# Patient Record
Sex: FEMALE
[De-identification: names, ages, dates, MRNs, and addresses within clinical notes are randomized per-mention and may not be internally consistent; named-entity substitution may affect disease eponyms.]

---
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
## 2018-01-26 ENCOUNTER — HOSPITAL ENCOUNTER (EMERGENCY)
Dept: HOSPITAL 25 - UCEAST | Age: 18
Discharge: HOME | End: 2018-01-26
Payer: COMMERCIAL

## 2018-01-26 VITALS — DIASTOLIC BLOOD PRESSURE: 63 MMHG | SYSTOLIC BLOOD PRESSURE: 109 MMHG

## 2018-01-26 DIAGNOSIS — H10.30: Primary | ICD-10-CM

## 2018-01-26 DIAGNOSIS — J20.9: ICD-10-CM

## 2018-01-26 PROCEDURE — G0463 HOSPITAL OUTPT CLINIC VISIT: HCPCS

## 2018-01-26 PROCEDURE — 99212 OFFICE O/P EST SF 10 MIN: CPT

## 2018-01-26 NOTE — UC
Respiratory Complaint HPI





- HPI Summary


HPI Summary: 





16 yo female with cough/nasal congestion/post nasal drip and sore throat x 1 1/

2 weeks





no f/c


no cp or sob





cough productive of green sputum





now eyes red and goopy











- History of Current Complaint


Chief Complaint: UCRespiratory


Stated Complaint: EYE ISSUE SORE THROAT CONGESTION


Time Seen by Provider: 01/26/18 07:52


Hx Obtained From: Patient


Hx Last Menstrual Period: 1/7/2018


Onset/Duration: Gradual Onset, Lasting Weeks


Timing: Constant


Severity Initially: Mild


Severity Currently: Moderate


Pain Intensity: 5


Pain Scale Used: 0-10 Numeric


Character: Cough: Productive - green


Aggravating Factors: Nothing


Alleviating Factors: Nothing


Associated Signs And Symptoms: Positive: Nasal Congestion, Hoarseness, Sinus 

Discomfort





- Allergies/Home Medications


Allergies/Adverse Reactions: 


 Allergies











Allergy/AdvReac Type Severity Reaction Status Date / Time


 


No Known Allergies Allergy   Verified 01/26/18 07:36














PMH/Surg Hx/FS Hx/Imm Hx


Previously Healthy: Yes





- Surgical History


Surgical History: Yes


Surgery Procedure, Year, and Place: ACL and meniscus repair





- Family History


Known Family History: Positive: Unknown - pt adopted





- Social History


Alcohol Use: Occasionally


Substance Use Type: None


Smoking Status (MU): Never Smoked Tobacco





Review of Systems


Constitutional: Negative


Skin: Negative


Eyes: Drainage, Eye Redness


ENT: Sore Throat, Nasal Discharge, Sinus Congestion, Sinus Pain/Tenderness


Respiratory: Cough


Cardiovascular: Negative


Gastrointestinal: Negative


Genitourinary: Negative


Motor: Negative


Neurovascular: Negative


Musculoskeletal: Negative


Neurological: Negative


Psychological: Negative


Is Patient Immunocompromised?: No


All Other Systems Reviewed And Are Negative: Yes





Physical Exam


Triage Information Reviewed: Yes


Appearance: Well-Appearing, No Pain Distress, Well-Nourished


Vital Signs: 


 Initial Vital Signs











Temp  97.7 F   01/26/18 07:38


 


Pulse  70   01/26/18 07:38


 


Resp  18   01/26/18 07:38


 


BP  109/63   01/26/18 07:38


 


Pulse Ox  100   01/26/18 07:38











Vital Signs Reviewed: Yes


Eyes: Positive: Conjunctiva Inflamed, Discharge


ENT: Positive: Hearing grossly normal, Nasal congestion, TMs normal, Hoarse 

voice, Sinus tenderness - slight max.  Negative: Trismus, Muffled voice


Neck: Positive: Supple, Nontender, No Lymphadenopathy


Respiratory: Positive: Lungs clear, Normal breath sounds, No respiratory 

distress, No accessory muscle use


Cardiovascular: Positive: RRR, No Murmur


Musculoskeletal: Positive: ROM Intact, No Edema


Neurological: Positive: Alert


Psychological Exam: Normal


Skin Exam: Normal





UC Diagnostic Evaluation





- Laboratory


O2 Sat by Pulse Oximetry: 100 - normal/not hypoxic





Respiratory Course/Dx





- Differential Dx/Diagnosis


Provider Diagnoses: acute conjunctivitis.  braonchitis (acute)





Discharge





- Discharge Plan


Condition: Stable


Disposition: HOME


Prescriptions: 


Amoxicillin PO (*) [Amoxicillin 875 MG (*)] 875 mg PO BID #14 tab


Polymyx/Trimethoprim OPTH* [Polytrim OPHTH*] 1 - 2 drop BOTH EYES QID #1 btl


Patient Education Materials:  Acute Bronchitis (ED), Conjunctivitis (ED)


Referrals: 


No Primary Care Phys,NOPCP [Primary Care Provider] - 


Additional Instructions: 


recheck in 4-5 days if not better

## 2018-02-20 ENCOUNTER — APPOINTMENT (OUTPATIENT)
Dept: OBGYN | Facility: CLINIC | Age: 18
End: 2018-02-20

## 2018-05-22 ENCOUNTER — APPOINTMENT (OUTPATIENT)
Dept: OBGYN | Facility: CLINIC | Age: 18
End: 2018-05-22
Payer: COMMERCIAL

## 2018-05-22 PROCEDURE — 99395 PREV VISIT EST AGE 18-39: CPT

## 2018-06-19 ENCOUNTER — APPOINTMENT (OUTPATIENT)
Dept: OBGYN | Facility: CLINIC | Age: 18
End: 2018-06-19

## 2018-07-18 ENCOUNTER — APPOINTMENT (OUTPATIENT)
Dept: OBGYN | Facility: CLINIC | Age: 18
End: 2018-07-18
Payer: COMMERCIAL

## 2018-07-18 PROCEDURE — 76998 US GUIDE INTRAOP: CPT

## 2018-07-18 PROCEDURE — 81025 URINE PREGNANCY TEST: CPT

## 2018-07-18 PROCEDURE — 58300 INSERT INTRAUTERINE DEVICE: CPT

## 2018-08-07 ENCOUNTER — APPOINTMENT (OUTPATIENT)
Dept: FAMILY MEDICINE | Facility: CLINIC | Age: 18
End: 2018-08-07
Payer: COMMERCIAL

## 2018-08-07 VITALS
WEIGHT: 132 LBS | OXYGEN SATURATION: 99 % | BODY MASS INDEX: 22.53 KG/M2 | SYSTOLIC BLOOD PRESSURE: 108 MMHG | RESPIRATION RATE: 14 BRPM | DIASTOLIC BLOOD PRESSURE: 62 MMHG | HEIGHT: 64 IN | HEART RATE: 70 BPM | TEMPERATURE: 98.3 F

## 2018-08-07 DIAGNOSIS — Z87.09 PERSONAL HISTORY OF OTHER DISEASES OF THE RESPIRATORY SYSTEM: ICD-10-CM

## 2018-08-07 DIAGNOSIS — R53.81 OTHER MALAISE: ICD-10-CM

## 2018-08-07 DIAGNOSIS — Z87.19 PERSONAL HISTORY OF OTHER DISEASES OF THE DIGESTIVE SYSTEM: ICD-10-CM

## 2018-08-07 DIAGNOSIS — Z86.69 PERSONAL HISTORY OF OTHER DISEASES OF THE NERVOUS SYSTEM AND SENSE ORGANS: ICD-10-CM

## 2018-08-07 DIAGNOSIS — R22.2 LOCALIZED SWELLING, MASS AND LUMP, TRUNK: ICD-10-CM

## 2018-08-07 DIAGNOSIS — Z87.898 PERSONAL HISTORY OF OTHER SPECIFIED CONDITIONS: ICD-10-CM

## 2018-08-07 DIAGNOSIS — R53.83 OTHER MALAISE: ICD-10-CM

## 2018-08-07 DIAGNOSIS — Z87.440 PERSONAL HISTORY OF URINARY (TRACT) INFECTIONS: ICD-10-CM

## 2018-08-07 PROCEDURE — 90471 IMMUNIZATION ADMIN: CPT

## 2018-08-07 PROCEDURE — 90620 MENB-4C VACCINE IM: CPT

## 2018-08-07 PROCEDURE — 99395 PREV VISIT EST AGE 18-39: CPT | Mod: 25

## 2018-08-07 RX ORDER — GUAIFENESIN 600 MG/1
600 TABLET, EXTENDED RELEASE ORAL TWICE DAILY
Qty: 28 | Refills: 0 | Status: DISCONTINUED | COMMUNITY
Start: 2017-01-03 | End: 2018-08-07

## 2018-08-07 RX ORDER — PSEUDOEPHEDRINE HCL 30 MG/1
30 TABLET, FILM COATED ORAL
Qty: 1 | Refills: 0 | Status: DISCONTINUED | COMMUNITY
Start: 2017-01-03 | End: 2018-08-07

## 2018-08-09 ENCOUNTER — APPOINTMENT (OUTPATIENT)
Dept: FAMILY MEDICINE | Facility: CLINIC | Age: 18
End: 2018-08-09
Payer: COMMERCIAL

## 2018-08-09 VITALS — TEMPERATURE: 98.8 F | DIASTOLIC BLOOD PRESSURE: 58 MMHG | SYSTOLIC BLOOD PRESSURE: 98 MMHG

## 2018-08-09 DIAGNOSIS — Z23 ENCOUNTER FOR IMMUNIZATION: ICD-10-CM

## 2018-08-09 DIAGNOSIS — T78.40XA ALLERGY, UNSPECIFIED, INITIAL ENCOUNTER: ICD-10-CM

## 2018-08-09 LAB
25(OH)D3 SERPL-MCNC: 43.8 NG/ML
ALBUMIN SERPL ELPH-MCNC: 4.8 G/DL
ALP BLD-CCNC: 74 U/L
ALT SERPL-CCNC: 15 U/L
ANION GAP SERPL CALC-SCNC: 16 MMOL/L
APPEARANCE: CLEAR
AST SERPL-CCNC: 24 U/L
BACTERIA UR CULT: NORMAL
BACTERIA: NEGATIVE
BASOPHILS # BLD AUTO: 0.01 K/UL
BASOPHILS NFR BLD AUTO: 0.1 %
BILIRUB SERPL-MCNC: 0.5 MG/DL
BILIRUBIN URINE: NEGATIVE
BLOOD URINE: NEGATIVE
BUN SERPL-MCNC: 14 MG/DL
CALCIUM SERPL-MCNC: 9.6 MG/DL
CHLORIDE SERPL-SCNC: 103 MMOL/L
CHOLEST SERPL-MCNC: 198 MG/DL
CHOLEST/HDLC SERPL: 3.8 RATIO
CO2 SERPL-SCNC: 24 MMOL/L
COLOR: YELLOW
CREAT SERPL-MCNC: 0.89 MG/DL
EOSINOPHIL # BLD AUTO: 0 K/UL
EOSINOPHIL NFR BLD AUTO: 0 %
FOLATE SERPL-MCNC: 11.9 NG/ML
GGT SERPL-CCNC: 21 U/L
GLUCOSE QUALITATIVE U: NEGATIVE MG/DL
GLUCOSE SERPL-MCNC: 84 MG/DL
HBA1C MFR BLD HPLC: 5.1 %
HCT VFR BLD CALC: 43.5 %
HDLC SERPL-MCNC: 52 MG/DL
HGB BLD-MCNC: 13.3 G/DL
HYALINE CASTS: 8 /LPF
IMM GRANULOCYTES NFR BLD AUTO: 0.1 %
KETONES URINE: ABNORMAL
LDLC SERPL CALC-MCNC: 124 MG/DL
LEUKOCYTE ESTERASE URINE: NEGATIVE
LYMPHOCYTES # BLD AUTO: 1.37 K/UL
LYMPHOCYTES NFR BLD AUTO: 19.9 %
MAN DIFF?: NORMAL
MCHC RBC-ENTMCNC: 29.9 PG
MCHC RBC-ENTMCNC: 30.6 GM/DL
MCV RBC AUTO: 97.8 FL
MICROSCOPIC-UA: NORMAL
MONOCYTES # BLD AUTO: 0.39 K/UL
MONOCYTES NFR BLD AUTO: 5.7 %
NEUTROPHILS # BLD AUTO: 5.1 K/UL
NEUTROPHILS NFR BLD AUTO: 74.2 %
NITRITE URINE: NEGATIVE
PH URINE: 6
PLATELET # BLD AUTO: 231 K/UL
POTASSIUM SERPL-SCNC: 4.3 MMOL/L
PROT SERPL-MCNC: 7 G/DL
PROTEIN URINE: ABNORMAL MG/DL
RBC # BLD: 4.45 M/UL
RBC # FLD: 12.9 %
RED BLOOD CELLS URINE: 2 /HPF
SODIUM SERPL-SCNC: 142 MMOL/L
SPECIFIC GRAVITY URINE: 1.03
SQUAMOUS EPITHELIAL CELLS: 6 /HPF
T4 FREE SERPL-MCNC: 1.5 NG/DL
T4 SERPL-MCNC: 7.9 UG/DL
TRIGL SERPL-MCNC: 108 MG/DL
TSH SERPL-ACNC: 0.92 UIU/ML
UROBILINOGEN URINE: NEGATIVE MG/DL
VIT B12 SERPL-MCNC: 357 PG/ML
WBC # FLD AUTO: 6.88 K/UL
WHITE BLOOD CELLS URINE: 3 /HPF

## 2018-08-09 PROCEDURE — 99213 OFFICE O/P EST LOW 20 MIN: CPT

## 2018-11-21 ENCOUNTER — APPOINTMENT (OUTPATIENT)
Dept: OBGYN | Facility: CLINIC | Age: 18
End: 2018-11-21
Payer: COMMERCIAL

## 2018-11-21 PROCEDURE — 99213 OFFICE O/P EST LOW 20 MIN: CPT

## 2019-02-25 ENCOUNTER — APPOINTMENT (OUTPATIENT)
Dept: OBGYN | Facility: CLINIC | Age: 19
End: 2019-02-25
Payer: COMMERCIAL

## 2019-02-25 ENCOUNTER — TRANSCRIPTION ENCOUNTER (OUTPATIENT)
Age: 19
End: 2019-02-25

## 2019-02-25 PROCEDURE — 36415 COLL VENOUS BLD VENIPUNCTURE: CPT

## 2019-02-25 PROCEDURE — 99213 OFFICE O/P EST LOW 20 MIN: CPT

## 2019-08-20 ENCOUNTER — FORM ENCOUNTER (OUTPATIENT)
Age: 19
End: 2019-08-20

## 2019-08-21 ENCOUNTER — APPOINTMENT (OUTPATIENT)
Dept: OBGYN | Facility: CLINIC | Age: 19
End: 2019-08-21
Payer: COMMERCIAL

## 2019-08-21 ENCOUNTER — FORM ENCOUNTER (OUTPATIENT)
Age: 19
End: 2019-08-21

## 2019-08-21 PROCEDURE — 99214 OFFICE O/P EST MOD 30 MIN: CPT | Mod: 25

## 2019-08-21 PROCEDURE — 76830 TRANSVAGINAL US NON-OB: CPT

## 2019-08-22 ENCOUNTER — APPOINTMENT (OUTPATIENT)
Dept: FAMILY MEDICINE | Facility: CLINIC | Age: 19
End: 2019-08-22
Payer: COMMERCIAL

## 2019-08-22 VITALS
OXYGEN SATURATION: 97 % | HEART RATE: 67 BPM | DIASTOLIC BLOOD PRESSURE: 70 MMHG | SYSTOLIC BLOOD PRESSURE: 102 MMHG | HEIGHT: 64.57 IN | TEMPERATURE: 97.9 F | BODY MASS INDEX: 22.94 KG/M2 | RESPIRATION RATE: 15 BRPM | WEIGHT: 136 LBS

## 2019-08-22 PROCEDURE — 99395 PREV VISIT EST AGE 18-39: CPT

## 2019-08-22 RX ORDER — LEVONORGESTREL 19.5 MG/1
INTRAUTERINE DEVICE INTRAUTERINE
Refills: 0 | Status: ACTIVE | COMMUNITY

## 2019-08-22 NOTE — PHYSICAL EXAM
[Well Nourished] : well nourished [No Acute Distress] : no acute distress [Well Developed] : well developed [Well-Appearing] : well-appearing [Normal Sclera/Conjunctiva] : normal sclera/conjunctiva [PERRL] : pupils equal round and reactive to light [EOMI] : extraocular movements intact [Normal Outer Ear/Nose] : the outer ears and nose were normal in appearance [No JVD] : no jugular venous distention [Normal Oropharynx] : the oropharynx was normal [No Lymphadenopathy] : no lymphadenopathy [Supple] : supple [Thyroid Normal, No Nodules] : the thyroid was normal and there were no nodules present [No Respiratory Distress] : no respiratory distress  [No Accessory Muscle Use] : no accessory muscle use [Clear to Auscultation] : lungs were clear to auscultation bilaterally [Normal Rate] : normal rate  [Regular Rhythm] : with a regular rhythm [Normal S1, S2] : normal S1 and S2 [No Murmur] : no murmur heard [No Carotid Bruits] : no carotid bruits [No Varicosities] : no varicosities [No Abdominal Bruit] : a ~M bruit was not heard ~T in the abdomen [Pedal Pulses Present] : the pedal pulses are present [No Edema] : there was no peripheral edema [No Palpable Aorta] : no palpable aorta [No Extremity Clubbing/Cyanosis] : no extremity clubbing/cyanosis [Soft] : abdomen soft [Non Tender] : non-tender [Non-distended] : non-distended [No Masses] : no abdominal mass palpated [No HSM] : no HSM [Normal Bowel Sounds] : normal bowel sounds [Normal Anterior Cervical Nodes] : no anterior cervical lymphadenopathy [Normal Posterior Cervical Nodes] : no posterior cervical lymphadenopathy [No CVA Tenderness] : no CVA  tenderness [No Spinal Tenderness] : no spinal tenderness [No Joint Swelling] : no joint swelling [Grossly Normal Strength/Tone] : grossly normal strength/tone [No Rash] : no rash [Coordination Grossly Intact] : coordination grossly intact [No Focal Deficits] : no focal deficits [Normal Gait] : normal gait [Normal Affect] : the affect was normal [Deep Tendon Reflexes (DTR)] : deep tendon reflexes were 2+ and symmetric [Normal Insight/Judgement] : insight and judgment were intact

## 2019-08-22 NOTE — HISTORY OF PRESENT ILLNESS
[FreeTextEntry1] : CPE [de-identified] : RACHEL RIVERA 19 year F presents for wellness exam. Doing well at this time. Eats well-balanced diet. Exercises regularly. Has IUD. Has experienced several episodes of hallucinations and sleep paralysis over the past year. Sleeps 6-8 hours/night. Also sometimes experiences shaking tremors while falling asleep. No neurologic history. 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

## 2019-08-22 NOTE — ASSESSMENT
[FreeTextEntry1] : Discussed CPE labs with patient, any abnormal results addressed and patient asked questions and understood discussion and any further workup if necessary.\par

## 2020-06-21 ENCOUNTER — TRANSCRIPTION ENCOUNTER (OUTPATIENT)
Age: 20
End: 2020-06-21

## 2020-07-14 ENCOUNTER — TRANSCRIPTION ENCOUNTER (OUTPATIENT)
Age: 20
End: 2020-07-14

## 2020-07-16 ENCOUNTER — APPOINTMENT (OUTPATIENT)
Dept: FAMILY MEDICINE | Facility: CLINIC | Age: 20
End: 2020-07-16
Payer: SELF-PAY

## 2020-07-16 VITALS
BODY MASS INDEX: 23.1 KG/M2 | HEIGHT: 64.57 IN | DIASTOLIC BLOOD PRESSURE: 58 MMHG | TEMPERATURE: 98.9 F | RESPIRATION RATE: 15 BRPM | OXYGEN SATURATION: 97 % | WEIGHT: 137 LBS | HEART RATE: 77 BPM | SYSTOLIC BLOOD PRESSURE: 96 MMHG

## 2020-07-16 DIAGNOSIS — Z00.00 ENCOUNTER FOR GENERAL ADULT MEDICAL EXAMINATION W/OUT ABNORMAL FINDINGS: ICD-10-CM

## 2020-07-16 PROCEDURE — 99395 PREV VISIT EST AGE 18-39: CPT

## 2020-07-16 NOTE — PHYSICAL EXAM
[No Acute Distress] : no acute distress [Well Nourished] : well nourished [Well Developed] : well developed [Well-Appearing] : well-appearing [Normal Sclera/Conjunctiva] : normal sclera/conjunctiva [PERRL] : pupils equal round and reactive to light [EOMI] : extraocular movements intact [Normal Outer Ear/Nose] : the outer ears and nose were normal in appearance [Normal Oropharynx] : the oropharynx was normal [No JVD] : no jugular venous distention [No Lymphadenopathy] : no lymphadenopathy [Supple] : supple [Thyroid Normal, No Nodules] : the thyroid was normal and there were no nodules present [No Respiratory Distress] : no respiratory distress  [No Accessory Muscle Use] : no accessory muscle use [Clear to Auscultation] : lungs were clear to auscultation bilaterally [Regular Rhythm] : with a regular rhythm [Normal Rate] : normal rate  [Normal S1, S2] : normal S1 and S2 [No Murmur] : no murmur heard [No Carotid Bruits] : no carotid bruits [No Abdominal Bruit] : a ~M bruit was not heard ~T in the abdomen [No Varicosities] : no varicosities [No Edema] : there was no peripheral edema [Pedal Pulses Present] : the pedal pulses are present [No Palpable Aorta] : no palpable aorta [No Extremity Clubbing/Cyanosis] : no extremity clubbing/cyanosis [Soft] : abdomen soft [Non Tender] : non-tender [Non-distended] : non-distended [Normal Bowel Sounds] : normal bowel sounds [No HSM] : no HSM [No Masses] : no abdominal mass palpated [Normal Posterior Cervical Nodes] : no posterior cervical lymphadenopathy [Normal Anterior Cervical Nodes] : no anterior cervical lymphadenopathy [No CVA Tenderness] : no CVA  tenderness [No Spinal Tenderness] : no spinal tenderness [No Rash] : no rash [No Joint Swelling] : no joint swelling [Grossly Normal Strength/Tone] : grossly normal strength/tone [No Focal Deficits] : no focal deficits [Coordination Grossly Intact] : coordination grossly intact [Deep Tendon Reflexes (DTR)] : deep tendon reflexes were 2+ and symmetric [Normal Affect] : the affect was normal [Normal Gait] : normal gait [Normal Insight/Judgement] : insight and judgment were intact

## 2020-07-16 NOTE — ASSESSMENT
[FreeTextEntry1] : -CPE labs given to patient, call with results\par \par Covid nasal swab negative 7/14\par

## 2020-07-16 NOTE — HISTORY OF PRESENT ILLNESS
[FreeTextEntry1] : CPE [de-identified] : RACHEL RIVERA 20 year F presents for wellness exam. Doing well at this time. Eats well-balanced diet. Tries to exercise. No complaints. Finished college, now looking for job in food research and [employer]. Sexually active, IUD. \par

## 2020-07-29 ENCOUNTER — TRANSCRIPTION ENCOUNTER (OUTPATIENT)
Age: 20
End: 2020-07-29

## 2020-09-01 ENCOUNTER — TRANSCRIPTION ENCOUNTER (OUTPATIENT)
Age: 20
End: 2020-09-01

## 2020-11-24 DIAGNOSIS — Z23 ENCOUNTER FOR IMMUNIZATION: ICD-10-CM

## 2021-01-07 ENCOUNTER — TRANSCRIPTION ENCOUNTER (OUTPATIENT)
Age: 21
End: 2021-01-07

## 2021-02-19 ENCOUNTER — TRANSCRIPTION ENCOUNTER (OUTPATIENT)
Age: 21
End: 2021-02-19

## 2021-03-04 ENCOUNTER — TRANSCRIPTION ENCOUNTER (OUTPATIENT)
Age: 21
End: 2021-03-04

## 2021-09-08 ENCOUNTER — TRANSCRIPTION ENCOUNTER (OUTPATIENT)
Age: 21
End: 2021-09-08

## 2021-10-28 DIAGNOSIS — Z98.890 OTHER SPECIFIED POSTPROCEDURAL STATES: ICD-10-CM

## 2021-10-28 DIAGNOSIS — N93.0 POSTCOITAL AND CONTACT BLEEDING: ICD-10-CM

## 2021-12-22 ENCOUNTER — APPOINTMENT (OUTPATIENT)
Dept: OBGYN | Facility: CLINIC | Age: 21
End: 2021-12-22

## 2022-03-22 ENCOUNTER — APPOINTMENT (OUTPATIENT)
Dept: OBGYN | Facility: CLINIC | Age: 22
End: 2022-03-22

## 2022-03-29 ENCOUNTER — APPOINTMENT (OUTPATIENT)
Dept: OBGYN | Facility: CLINIC | Age: 22
End: 2022-03-29
Payer: COMMERCIAL

## 2022-03-29 VITALS
HEIGHT: 63 IN | WEIGHT: 131 LBS | DIASTOLIC BLOOD PRESSURE: 70 MMHG | BODY MASS INDEX: 23.21 KG/M2 | SYSTOLIC BLOOD PRESSURE: 120 MMHG

## 2022-03-29 DIAGNOSIS — Z97.5 PRESENCE OF (INTRAUTERINE) CONTRACEPTIVE DEVICE: ICD-10-CM

## 2022-03-29 DIAGNOSIS — Z11.3 ENCOUNTER FOR SCREENING FOR INFECTIONS WITH A PREDOMINANTLY SEXUAL MODE OF TRANSMISSION: ICD-10-CM

## 2022-03-29 DIAGNOSIS — Z12.4 ENCOUNTER FOR SCREENING FOR MALIGNANT NEOPLASM OF CERVIX: ICD-10-CM

## 2022-03-29 DIAGNOSIS — N94.10 UNSPECIFIED DYSPAREUNIA: ICD-10-CM

## 2022-03-29 DIAGNOSIS — Z01.411 ENCOUNTER FOR GYNECOLOGICAL EXAMINATION (GENERAL) (ROUTINE) WITH ABNORMAL FINDINGS: ICD-10-CM

## 2022-03-29 PROCEDURE — 36415 COLL VENOUS BLD VENIPUNCTURE: CPT

## 2022-03-29 PROCEDURE — 99395 PREV VISIT EST AGE 18-39: CPT

## 2022-03-29 PROCEDURE — 99212 OFFICE O/P EST SF 10 MIN: CPT | Mod: 25

## 2022-03-29 RX ORDER — AZITHROMYCIN 250 MG/1
250 TABLET, FILM COATED ORAL
Qty: 1 | Refills: 0 | Status: DISCONTINUED | COMMUNITY
Start: 2021-04-20 | End: 2022-03-29

## 2022-03-29 NOTE — PROCEDURE
[Cervical Pap Smear] : cervical Pap smear [Liquid Base] : liquid base [GC & Chlamydia via Pap] : GC & Chlamydia via Pap [Tolerated Well] : the patient tolerated the procedure well [No Complications] : there were no complications [Transvaginal OB Sonogram] : Transvaginal OB Sonogram [FreeTextEntry1] : IUD is in place

## 2022-03-29 NOTE — HISTORY OF PRESENT ILLNESS
[Currently Active] : currently active [Men] : men [No] : No [Patient would like to be screened for STIs] : Patient would like to be screened for STIs [Localized] : localized [Enigma] : intercourse [Normal Amount/Duration] :  normal amount and duration [Yes] : Patient has concerns regarding sex [TextBox_10] : pressure [TextBox_13] : during intercourse, intermittent x2 months [FreeTextEntry1] : discomfort with intercourse [FreeTextEntry2] : one long term partner >1 yr

## 2022-03-29 NOTE — REASON FOR VISIT
[Annual] : an annual visit. [Pelvic Pain] : pelvic pain [Dyspareunia] : dyspareunia [Other: _____] : [unfilled]

## 2022-03-29 NOTE — REVIEW OF SYSTEMS
[Negative] : Heme/Lymph [Patient Intake Form Reviewed] : Patient intake form was reviewed [Pelvic pain] : pelvic pain

## 2022-03-29 NOTE — PLAN
[FreeTextEntry1] : 23 YO G0 presents for an annual wellness exam. \par Dyspareunia\par - TVUS done to confirm that the IUD is in appropriate location and orientation. RX for pelvic sonogram to confirm IUD placement and rule out any fibroids/cysts causing pain. Pt may use outside radiology office and pt will call 1 week after sonogram.\par \par Routine GYN\par - Discussed and reviewed importance of monthly BSE\par - Accepts STI testing, importance safe sexual practices discussed. STI bloods done. \par - PAP with GC/CT test collected and sent at today's visit\par - Pt aware Kyleena IUD inserted 7/2019, that IUD must be replaced every 5 years. Pt to RTO next year to discuss/replace IUD.\par - f/u PCP for recommended HCM, vaccinations and CA screening. \par \par During this visit 15 minutes were spent face-to-face with greater than 50% of the time dedicated to counseling.\par

## 2022-03-29 NOTE — COUNSELING
[Body Image] : body image [Breast Self Exam] : breast self exam [Contraception/ Emergency Contraception/ Safe Sexual Practices] : contraception, emergency contraception, safe sexual practices [STD (testing, results, tx)] : STD (testing, results, tx) [Lab Results] : lab results [Medication Management] : medication management [Other ___] : [unfilled] [Confidentiality] : confidentiality [FreeTextEntry2] : pelvic sono

## 2022-03-29 NOTE — PHYSICAL EXAM
[Appropriately responsive] : appropriately responsive [Alert] : alert [No Acute Distress] : no acute distress [Soft] : soft [Non-tender] : non-tender [Non-distended] : non-distended [No HSM] : No HSM [No Lesions] : no lesions [No Mass] : no mass [Oriented x3] : oriented x3 [Examination Of The Breasts] : a normal appearance [No Masses] : no breast masses were palpable [Labia Majora] : normal [Labia Minora] : normal [IUD String] : an IUD string was noted [Normal] : normal [Uterine Adnexae] : normal [Chaperone Declined] : Patient declined chaperone [FreeTextEntry8] : no cmt. no adnexal masses

## 2022-03-30 LAB
C TRACH RRNA SPEC QL NAA+PROBE: NOT DETECTED
HBV SURFACE AG SER QL: NONREACTIVE
HCV AB SER QL: NONREACTIVE
HCV S/CO RATIO: 0.15 S/CO
HIV1+2 AB SPEC QL IA.RAPID: NONREACTIVE
N GONORRHOEA RRNA SPEC QL NAA+PROBE: NOT DETECTED
SOURCE AMPLIFICATION: NORMAL
T PALLIDUM AB SER QL IA: NEGATIVE

## 2022-04-05 DIAGNOSIS — B37.3 CANDIDIASIS OF VULVA AND VAGINA: ICD-10-CM

## 2022-04-05 DIAGNOSIS — B37.9 CANDIDIASIS, UNSPECIFIED: ICD-10-CM

## 2022-04-05 LAB — CYTOLOGY CVX/VAG DOC THIN PREP: ABNORMAL

## 2022-04-05 RX ORDER — FLUCONAZOLE 150 MG/1
150 TABLET ORAL
Qty: 2 | Refills: 0 | Status: ACTIVE | COMMUNITY
Start: 2022-04-05 | End: 1900-01-01

## 2022-05-10 RX ORDER — FLUCONAZOLE 150 MG/1
150 TABLET ORAL
Qty: 2 | Refills: 0 | Status: ACTIVE | COMMUNITY
Start: 2022-05-10 | End: 1900-01-01

## 2022-05-13 ENCOUNTER — OUTPATIENT (OUTPATIENT)
Dept: OUTPATIENT SERVICES | Facility: HOSPITAL | Age: 22
LOS: 1 days | End: 2022-05-13

## 2022-05-13 ENCOUNTER — RESULT REVIEW (OUTPATIENT)
Age: 22
End: 2022-05-13

## 2022-05-13 ENCOUNTER — APPOINTMENT (OUTPATIENT)
Dept: ULTRASOUND IMAGING | Facility: CLINIC | Age: 22
End: 2022-05-13
Payer: COMMERCIAL

## 2022-05-13 PROCEDURE — 76830 TRANSVAGINAL US NON-OB: CPT | Mod: 26

## 2022-05-13 PROCEDURE — 76856 US EXAM PELVIC COMPLETE: CPT | Mod: 26

## 2022-06-16 ENCOUNTER — APPOINTMENT (OUTPATIENT)
Dept: FAMILY MEDICINE | Facility: CLINIC | Age: 22
End: 2022-06-16
Payer: COMMERCIAL

## 2022-06-16 DIAGNOSIS — U07.1 COVID-19: ICD-10-CM

## 2022-06-16 DIAGNOSIS — R05.9 COUGH, UNSPECIFIED: ICD-10-CM

## 2022-06-16 PROCEDURE — 99213 OFFICE O/P EST LOW 20 MIN: CPT | Mod: 95

## 2022-06-16 NOTE — HISTORY OF PRESENT ILLNESS
[Home] : at home, [unfilled] , at the time of the visit. [Medical Office: ([facility])___] : at the medical office located in  [Verbal consent obtained from patient] : the patient, [unfilled] [FreeTextEntry8] : 23 y/o F presents via Telehealth.Pt tested + Covid 2 days ago. Had started a Zpack the day before. Continues to have cough now slightly productive. No SOB or wheezing. No fever. Took some Mucinex make her feel dizzy and disoriented. HAs HA, body aches

## 2022-11-11 ENCOUNTER — APPOINTMENT (OUTPATIENT)
Dept: OBGYN | Facility: CLINIC | Age: 22
End: 2022-11-11

## 2022-12-08 ENCOUNTER — APPOINTMENT (OUTPATIENT)
Dept: OBGYN | Facility: CLINIC | Age: 22
End: 2022-12-08

## 2022-12-08 ENCOUNTER — ASOB RESULT (OUTPATIENT)
Age: 22
End: 2022-12-08

## 2022-12-08 VITALS — DIASTOLIC BLOOD PRESSURE: 72 MMHG | SYSTOLIC BLOOD PRESSURE: 110 MMHG | WEIGHT: 131 LBS

## 2022-12-08 DIAGNOSIS — Z30.430 ENCOUNTER FOR INSERTION OF INTRAUTERINE CONTRACEPTIVE DEVICE: ICD-10-CM

## 2022-12-08 DIAGNOSIS — Z30.432 ENCOUNTER FOR REMOVAL OF INTRAUTERINE CONTRACEPTIVE DEVICE: ICD-10-CM

## 2022-12-08 PROCEDURE — 58300 INSERT INTRAUTERINE DEVICE: CPT

## 2022-12-08 PROCEDURE — 58301 REMOVE INTRAUTERINE DEVICE: CPT

## 2022-12-08 PROCEDURE — 81025 URINE PREGNANCY TEST: CPT

## 2022-12-08 PROCEDURE — 76998 US GUIDE INTRAOP: CPT

## 2022-12-12 PROBLEM — Z30.432 ENCOUNTER FOR REMOVAL OF INTRAUTERINE CONTRACEPTIVE DEVICE (IUD): Status: ACTIVE | Noted: 2022-12-12

## 2022-12-12 LAB
C TRACH RRNA SPEC QL NAA+PROBE: NOT DETECTED
N GONORRHOEA RRNA SPEC QL NAA+PROBE: NOT DETECTED
SOURCE AMPLIFICATION: NORMAL

## 2022-12-12 NOTE — PROCEDURE
[IUD Placement] : intrauterine device (IUD) placement [Neg Pregnancy Test] : negative pregnancy test [Infection] : infection [Bleeding] : bleeding [Pain] : pain [Expulsion] : expulsion [Failure] : failure [Uterine Perforation] : uterine perforation [Betadine] : Betadine [Tenaculum] : Tenaculum [Kyleena IUD] : Kyleena IUD [IUD Removal] : intrauterine device (IUD) removal [Time out performed] : Pre-procedure time out performed.  Patient's name, date of birth and procedure confirmed. [Consent Obtained] : Consent obtained [Risks] : risks [Benefits] : benefits [Alternatives] : alternatives [Patient] : patient [IUD Discarded] : IUD discarded [Sent to Pathology] : specimen was placed in buffered formalin and sent for pathology [Tolerated Well] : Patient tolerated the procedure well [No Complications] : no complications [Heavy Vaginal Bleeding] : for heavy vaginal bleeding [Pelvic Pain] : for pelvic pain [Easy Passage] : Easy passage [ IUD] :  IUD [Speculum Placed] : speculum placed [Strings Visualized] : strings visualized [LMPDate] : 12/01/22 [de-identified] : removed with Allis clamp

## 2022-12-12 NOTE — PLAN
[FreeTextEntry1] : RACHEL RIVERA is a 22 year old LMP 12/1/22 presents for IUD insertion procedure.\par -RTO in 1 month for string check\par

## 2023-01-13 ENCOUNTER — APPOINTMENT (OUTPATIENT)
Dept: OBGYN | Facility: CLINIC | Age: 23
End: 2023-01-13

## 2024-06-13 NOTE — END OF VISIT
[FreeTextEntry2] : I, Arpita Fish, acted as a scribe on behalf of Dr. Mechelle Mosley on 12/08/2022 .\par \par All medical entries made by the scribe were at my, Dr. Mechelle Mosley, direction and personally dictated by me on 12/08/2022. I have reviewed the chart and agree that the record accurately reflects my personal performance of the history, physical exam, assessment and plan. I have also personally directed, reviewed, and agreed with the chart.\par  Simple: Patient demonstrates quick and easy understanding